# Patient Record
Sex: MALE | Race: WHITE
[De-identification: names, ages, dates, MRNs, and addresses within clinical notes are randomized per-mention and may not be internally consistent; named-entity substitution may affect disease eponyms.]

---

## 2018-07-31 ENCOUNTER — HOSPITAL ENCOUNTER (EMERGENCY)
Dept: HOSPITAL 62 - ER | Age: 48
LOS: 1 days | Discharge: HOME | End: 2018-08-01
Payer: COMMERCIAL

## 2018-07-31 DIAGNOSIS — K11.20: Primary | ICD-10-CM

## 2018-07-31 DIAGNOSIS — M54.2: ICD-10-CM

## 2018-07-31 DIAGNOSIS — R68.84: ICD-10-CM

## 2018-07-31 DIAGNOSIS — Z88.0: ICD-10-CM

## 2018-07-31 DIAGNOSIS — R59.1: ICD-10-CM

## 2018-07-31 LAB
ADD MANUAL DIFF: NO
BASOPHILS # BLD AUTO: 0 10^3/UL (ref 0–0.2)
BASOPHILS NFR BLD AUTO: 0.4 % (ref 0–2)
EOSINOPHIL # BLD AUTO: 0.1 10^3/UL (ref 0–0.6)
EOSINOPHIL NFR BLD AUTO: 0.9 % (ref 0–6)
ERYTHROCYTE [DISTWIDTH] IN BLOOD BY AUTOMATED COUNT: 13 % (ref 11.5–14)
HCT VFR BLD CALC: 39.8 % (ref 37.9–51)
HGB BLD-MCNC: 13.7 G/DL (ref 13.5–17)
LYMPHOCYTES # BLD AUTO: 1.2 10^3/UL (ref 0.5–4.7)
LYMPHOCYTES NFR BLD AUTO: 16.4 % (ref 13–45)
MCH RBC QN AUTO: 30.1 PG (ref 27–33.4)
MCHC RBC AUTO-ENTMCNC: 34.5 G/DL (ref 32–36)
MCV RBC AUTO: 87 FL (ref 80–97)
MONOCYTES # BLD AUTO: 0.6 10^3/UL (ref 0.1–1.4)
MONOCYTES NFR BLD AUTO: 9 % (ref 3–13)
NEUTROPHILS # BLD AUTO: 5.2 10^3/UL (ref 1.7–8.2)
NEUTS SEG NFR BLD AUTO: 73.3 % (ref 42–78)
PLATELET # BLD: 150 10^3/UL (ref 150–450)
RBC # BLD AUTO: 4.56 10^6/UL (ref 4.35–5.55)
TOTAL CELLS COUNTED % (AUTO): 100 %
WBC # BLD AUTO: 7.1 10^3/UL (ref 4–10.5)

## 2018-07-31 PROCEDURE — 36415 COLL VENOUS BLD VENIPUNCTURE: CPT

## 2018-07-31 PROCEDURE — 70360 X-RAY EXAM OF NECK: CPT

## 2018-07-31 PROCEDURE — 96374 THER/PROPH/DIAG INJ IV PUSH: CPT

## 2018-07-31 PROCEDURE — 85025 COMPLETE CBC W/AUTO DIFF WBC: CPT

## 2018-07-31 PROCEDURE — 96375 TX/PRO/DX INJ NEW DRUG ADDON: CPT

## 2018-07-31 PROCEDURE — 70491 CT SOFT TISSUE NECK W/DYE: CPT

## 2018-07-31 PROCEDURE — 80048 BASIC METABOLIC PNL TOTAL CA: CPT

## 2018-07-31 PROCEDURE — 99284 EMERGENCY DEPT VISIT MOD MDM: CPT

## 2018-07-31 PROCEDURE — 96361 HYDRATE IV INFUSION ADD-ON: CPT

## 2018-07-31 NOTE — RADIOLOGY REPORT (SQ)
EXAM DESCRIPTION:

XR NECK SOFT TISSUE



COMPLETED DATE/TME:  07/31/2018 22:53



CLINICAL HISTORY:

48 years Male, eval for dental abscess, eval facial/neck swelling



COMPARISON:

None.



Findings:



Patent nasopharynx and airway. Bones, joints, and soft tissues of

the XR NECK SOFT TISSUE 2V appear intact. 



IMPRESSION:



No acute findings.

## 2018-08-01 VITALS — SYSTOLIC BLOOD PRESSURE: 127 MMHG | DIASTOLIC BLOOD PRESSURE: 78 MMHG

## 2018-08-01 LAB
ANION GAP SERPL CALC-SCNC: 12 MMOL/L (ref 5–19)
BUN SERPL-MCNC: 16 MG/DL (ref 7–20)
CALCIUM: 9 MG/DL (ref 8.4–10.2)
CHLORIDE SERPL-SCNC: 106 MMOL/L (ref 98–107)
CO2 SERPL-SCNC: 26 MMOL/L (ref 22–30)
GLUCOSE SERPL-MCNC: 96 MG/DL (ref 75–110)
POTASSIUM SERPL-SCNC: 4.4 MMOL/L (ref 3.6–5)
SODIUM SERPL-SCNC: 144.1 MMOL/L (ref 137–145)

## 2018-08-01 NOTE — RADIOLOGY REPORT (SQ)
EXAM DESCRIPTION:

CT NECK CHEST WITH IV CONTRAST



COMPLETED DATE/TME:  08/01/2018 00:00



CLINICAL HISTORY:

48 years Male, eval dental abscess L bottom rear tooth, eval neck

swelling



Comparison: None.



Technique:  IV contrast.  Coronal and sagittal reformat.  This

exam was performed according to our departmental

dose-optimization program, which includes automated exposure

control, adjustment of the mA and/or kV according to patient size

and/or use of iterative reconstruction technique.  [facility]: Dose

Right [facility]: CareDose   [facility]: Dose Right    [facility]: Teradose 4D   

[facility]: [employer]



LIMITATIONS:

None



Findings: 

Orbits: Not imaged.



Visualized paranasal sinuses, and skull base: [Normal. ]



Nasopharynx: [Normal. ]



Suprahyoid neck: 4.0 cm asymmetrically enlarged and inflamed left

submandibular gland compared with 3.0 cm submandibular gland on

the right. Mild left submandibular and suprahyoid cervical

lymphadenopathy. No abscess. No radiopaque sialolith. [Otherwise

unremarkable oropharynx, oral cavity, parapharyngeal space, and

retropharyngeal space. ]



Infrahyoid neck: [Normal larynx, hypopharynx, and supraglottis. ]



Thyroid: [Normal. ]



Thoracic inlet: [Normal lung apices and brachial plexus.]



Lymph nodes [as above.



Vascular structures: [Normal. ]



Other findings: [None. ]



Impression:



Moderate left submandibular gland sialoadenitis. Differential

etiologies include infectious, inflammatory, and neoplastic

processes.